# Patient Record
Sex: FEMALE | Race: BLACK OR AFRICAN AMERICAN | NOT HISPANIC OR LATINO | Employment: UNEMPLOYED | URBAN - METROPOLITAN AREA
[De-identification: names, ages, dates, MRNs, and addresses within clinical notes are randomized per-mention and may not be internally consistent; named-entity substitution may affect disease eponyms.]

---

## 2024-05-31 ENCOUNTER — HOSPITAL ENCOUNTER (EMERGENCY)
Facility: HOSPITAL | Age: 39
Discharge: HOME | End: 2024-05-31
Attending: INTERNAL MEDICINE
Payer: MEDICAID

## 2024-05-31 VITALS
TEMPERATURE: 98 F | HEIGHT: 63 IN | OXYGEN SATURATION: 100 % | SYSTOLIC BLOOD PRESSURE: 161 MMHG | BODY MASS INDEX: 41.64 KG/M2 | WEIGHT: 235 LBS | HEART RATE: 72 BPM | DIASTOLIC BLOOD PRESSURE: 99 MMHG | RESPIRATION RATE: 17 BRPM

## 2024-05-31 DIAGNOSIS — R07.89 CHEST WALL PAIN: Primary | ICD-10-CM

## 2024-05-31 DIAGNOSIS — R03.0 ELEVATED BLOOD PRESSURE READING: ICD-10-CM

## 2024-05-31 LAB
ALBUMIN SERPL-MCNC: 3.9 G/DL (ref 3.5–5)
ALBUMIN/GLOB SERPL: 1.2 RATIO (ref 1.1–2)
ALP SERPL-CCNC: 90 UNIT/L (ref 40–150)
ALT SERPL-CCNC: 14 UNIT/L (ref 0–55)
ANION GAP SERPL CALC-SCNC: 8 MEQ/L
AST SERPL-CCNC: 14 UNIT/L (ref 5–34)
BASOPHILS # BLD AUTO: 0.07 X10(3)/MCL
BASOPHILS NFR BLD AUTO: 0.7 %
BILIRUB SERPL-MCNC: 0.1 MG/DL
BUN SERPL-MCNC: 8 MG/DL (ref 7–18.7)
CALCIUM SERPL-MCNC: 8.9 MG/DL (ref 8.4–10.2)
CHLORIDE SERPL-SCNC: 109 MMOL/L (ref 98–107)
CO2 SERPL-SCNC: 23 MMOL/L (ref 22–29)
CREAT SERPL-MCNC: 0.7 MG/DL (ref 0.55–1.02)
CREAT/UREA NIT SERPL: 11
EOSINOPHIL # BLD AUTO: 0.29 X10(3)/MCL (ref 0–0.9)
EOSINOPHIL NFR BLD AUTO: 3 %
ERYTHROCYTE [DISTWIDTH] IN BLOOD BY AUTOMATED COUNT: 16.2 % (ref 11.5–17)
GFR SERPLBLD CREATININE-BSD FMLA CKD-EPI: >60 ML/MIN/1.73/M2
GLOBULIN SER-MCNC: 3.3 GM/DL (ref 2.4–3.5)
GLUCOSE SERPL-MCNC: 94 MG/DL (ref 74–100)
HCT VFR BLD AUTO: 38 % (ref 37–47)
HGB BLD-MCNC: 12.1 G/DL (ref 12–16)
IMM GRANULOCYTES # BLD AUTO: 0.02 X10(3)/MCL (ref 0–0.04)
IMM GRANULOCYTES NFR BLD AUTO: 0.2 %
LYMPHOCYTES # BLD AUTO: 3.91 X10(3)/MCL (ref 0.6–4.6)
LYMPHOCYTES NFR BLD AUTO: 40.9 %
MAGNESIUM SERPL-MCNC: 2 MG/DL (ref 1.6–2.6)
MCH RBC QN AUTO: 26.9 PG (ref 27–31)
MCHC RBC AUTO-ENTMCNC: 31.8 G/DL (ref 33–36)
MCV RBC AUTO: 84.4 FL (ref 80–94)
MONOCYTES # BLD AUTO: 0.65 X10(3)/MCL (ref 0.1–1.3)
MONOCYTES NFR BLD AUTO: 6.8 %
NEUTROPHILS # BLD AUTO: 4.63 X10(3)/MCL (ref 2.1–9.2)
NEUTROPHILS NFR BLD AUTO: 48.4 %
PLATELET # BLD AUTO: 358 X10(3)/MCL (ref 130–400)
PMV BLD AUTO: 9.4 FL (ref 7.4–10.4)
POTASSIUM SERPL-SCNC: 3.8 MMOL/L (ref 3.5–5.1)
PROT SERPL-MCNC: 7.2 GM/DL (ref 6.4–8.3)
RBC # BLD AUTO: 4.5 X10(6)/MCL (ref 4.2–5.4)
SODIUM SERPL-SCNC: 140 MMOL/L (ref 136–145)
TROPONIN I SERPL-MCNC: <0.01 NG/ML (ref 0–0.04)
WBC # SPEC AUTO: 9.57 X10(3)/MCL (ref 4.5–11.5)

## 2024-05-31 PROCEDURE — 83735 ASSAY OF MAGNESIUM: CPT | Performed by: INTERNAL MEDICINE

## 2024-05-31 PROCEDURE — 84484 ASSAY OF TROPONIN QUANT: CPT | Performed by: INTERNAL MEDICINE

## 2024-05-31 PROCEDURE — 80053 COMPREHEN METABOLIC PANEL: CPT | Performed by: INTERNAL MEDICINE

## 2024-05-31 PROCEDURE — 85025 COMPLETE CBC W/AUTO DIFF WBC: CPT | Performed by: INTERNAL MEDICINE

## 2024-05-31 PROCEDURE — 63600175 PHARM REV CODE 636 W HCPCS: Performed by: INTERNAL MEDICINE

## 2024-05-31 PROCEDURE — 99285 EMERGENCY DEPT VISIT HI MDM: CPT | Mod: 25

## 2024-05-31 PROCEDURE — 93010 ELECTROCARDIOGRAM REPORT: CPT | Mod: ,,, | Performed by: INTERNAL MEDICINE

## 2024-05-31 PROCEDURE — 96372 THER/PROPH/DIAG INJ SC/IM: CPT | Performed by: INTERNAL MEDICINE

## 2024-05-31 PROCEDURE — 93005 ELECTROCARDIOGRAM TRACING: CPT

## 2024-05-31 RX ORDER — TIZANIDINE 4 MG/1
4 TABLET ORAL EVERY 8 HOURS PRN
Qty: 15 TABLET | Refills: 0 | Status: SHIPPED | OUTPATIENT
Start: 2024-05-31

## 2024-05-31 RX ORDER — IBUPROFEN 600 MG/1
600 TABLET ORAL EVERY 6 HOURS PRN
Qty: 20 TABLET | Refills: 0 | Status: SHIPPED | OUTPATIENT
Start: 2024-05-31

## 2024-05-31 RX ORDER — TRAMADOL HYDROCHLORIDE 50 MG/1
50 TABLET ORAL EVERY 6 HOURS PRN
Qty: 15 TABLET | Refills: 0 | Status: SHIPPED | OUTPATIENT
Start: 2024-05-31

## 2024-05-31 RX ORDER — KETOROLAC TROMETHAMINE 30 MG/ML
60 INJECTION, SOLUTION INTRAMUSCULAR; INTRAVENOUS
Status: COMPLETED | OUTPATIENT
Start: 2024-05-31 | End: 2024-05-31

## 2024-05-31 RX ADMIN — KETOROLAC TROMETHAMINE 60 MG: 30 INJECTION, SOLUTION INTRAMUSCULAR; INTRAVENOUS at 04:05

## 2024-05-31 NOTE — ED PROVIDER NOTES
Source of History:  Patient, no limitations    Chief complaint:  Chest Pain ( Left CP that started 2 weeks ago   has radiated into post neck and traps over last few days)      HPI:  Bentley Cevallos is a 38 y.o. female presenting with Chest Pain ( Left CP that started 2 weeks ago   has radiated into post neck and traps over last few days)         The patient complains of chest pressure/discomfort. The discomfort is described as pleuritic, sharp with radiation to the left scapula. Onset of symptoms was 2 weeks ago, stable course since that time. The patient also complains of pain with movements and deep breathing. The patient denies fever. Patient's cardiac risk factors are obesity (BMI >= 30 kg/m2), sedentary lifestyle, and smoking/ tobacco exposure.       Review of Systems   Constitutional symptoms:  Negative except as documented in HPI.   Skin symptoms:  Negative except as documented in HPI.   HEENT symptoms:  Negative except as documented in HPI.   Respiratory symptoms:  Negative except as documented in HPI.   Cardiovascular symptoms:  Negative except as documented in HPI.   Gastrointestinal symptoms:  Negative except as documented in HPI.    Genitourinary symptoms:  Negative except as documented in HPI.   Musculoskeletal symptoms:  Negative except as documented in HPI.   Neurologic symptoms:  Negative except as documented in HPI.   Psychiatric symptoms:  Negative except as documented in HPI.   Allergy/immunologic symptoms:  Negative except as documented in HPI.             Additional review of systems information: All other systems reviewed and otherwise negative.      Review of patient's allergies indicates:  No Known Allergies    PMH:  As per HPI and below:    Past Medical History:   Diagnosis Date    Acid reflux     Anxiety     Depression        Family History   Problem Relation Name Age of Onset    Lupus Mother      Diabetes Mellitus Mother      Hypertension Father      Hypertension Brother      Gout  "Brother      No Known Problems Daughter         Past Surgical History:   Procedure Laterality Date     SECTION      GALLBLADDER SURGERY         Social History     Tobacco Use    Smoking status: Every Day   Substance Use Topics    Alcohol use: Yes     Alcohol/week: 0.0 standard drinks of alcohol     Comment: acc    Drug use: No       There is no problem list on file for this patient.       Physical Exam:    BP (!) 161/99   Pulse 72   Temp 98 °F (36.7 °C)   Resp 17   Ht 5' 3" (1.6 m)   Wt 106.6 kg (235 lb)   SpO2 100%   BMI 41.63 kg/m²     Nursing note and vital signs reviewed.    General:  Alert, no acute distress.   Skin: Normal for Ethnic Origin, No cyanosis  HEENT: Normocephalic and atraumatic, Vision unchanged  Cardiovascular:  Regular rate and rhythm  Chest Wall: No deformity, equal chest rise, reproducible left upper chest wall tenderness into left periscapular area  Respiratory:  Lungs are clear to auscultation, respirations are non-labored.    Musculoskeletal:  No deformity, Normal perfusion to all extremities  Gastrointestinal:  Soft, Non distended  Neurological:  Alert and oriented, normal motor observed, normal speech observed.    Psychiatric:  Cooperative, appropriate mood & affect.        Labs that have been ordered have been independently reviewed and interpreted by myself.     Old Chart Reviewed.      Initial Impression/ Differential Dx:  Bronchitis, URI, allergies, irritants, pulmonary edema, GERD, laryngitis, tracheitis, asthma, sinusitis, pneumonia, viral, COPD, medication side effect  Myocardial ischemia, pericarditis, pulmonary embolus, chest wall pain, pleural inflammation and pulmonary infectious causes.      MDM:      Reviewed Nurses Note.    Reviewed Pertinent old records.    Orders Placed This Encounter    Pulse Oximeter    X-Ray Chest PA And Lateral    CBC Auto Differential    Comprehensive Metabolic Panel    Troponin I    Magnesium    CBC with Differential    Cardiac " Monitoring - Adult    EKG 12-lead    ketorolac injection 60 mg    traMADoL (ULTRAM) 50 mg tablet    tiZANidine (ZANAFLEX) 4 MG tablet    ibuprofen (ADVIL,MOTRIN) 600 MG tablet                    Labs Reviewed   COMPREHENSIVE METABOLIC PANEL - Abnormal; Notable for the following components:       Result Value    Chloride 109 (*)     All other components within normal limits   CBC WITH DIFFERENTIAL - Abnormal; Notable for the following components:    MCH 26.9 (*)     MCHC 31.8 (*)     All other components within normal limits   TROPONIN I - Normal   MAGNESIUM - Normal   CBC W/ AUTO DIFFERENTIAL    Narrative:     The following orders were created for panel order CBC Auto Differential.  Procedure                               Abnormality         Status                     ---------                               -----------         ------                     CBC with Differential[[id number]]       Abnormal            Final result                 Please view results for these tests on the individual orders.          X-Ray Chest PA And Lateral   Final Result      1. No active cardiopulmonary disease identified         Electronically signed by: Nils Watkins   Date:    05/31/2024   Time:    16:35           Admission on 05/31/2024, Discharged on 05/31/2024   Component Date Value Ref Range Status    Sodium 05/31/2024 140  136 - 145 mmol/L Final    Potassium 05/31/2024 3.8  3.5 - 5.1 mmol/L Final    Chloride 05/31/2024 109 (H)  98 - 107 mmol/L Final    CO2 05/31/2024 23  22 - 29 mmol/L Final    Glucose 05/31/2024 94  74 - 100 mg/dL Final    Blood Urea Nitrogen 05/31/2024 8.0  7.0 - 18.7 mg/dL Final    Creatinine 05/31/2024 0.70  0.55 - 1.02 mg/dL Final    Calcium 05/31/2024 8.9  8.4 - 10.2 mg/dL Final    Protein Total 05/31/2024 7.2  6.4 - 8.3 gm/dL Final    Albumin 05/31/2024 3.9  3.5 - 5.0 g/dL Final    Globulin 05/31/2024 3.3  2.4 - 3.5 gm/dL Final    Albumin/Globulin Ratio 05/31/2024 1.2  1.1 - 2.0 ratio Final    Bilirubin  Total 05/31/2024 0.1  <=1.5 mg/dL Final    ALP 05/31/2024 90  40 - 150 unit/L Final    ALT 05/31/2024 14  0 - 55 unit/L Final    AST 05/31/2024 14  5 - 34 unit/L Final    eGFR 05/31/2024 >60  mL/min/1.73/m2 Final    Anion Gap 05/31/2024 8.0  mEq/L Final    BUN/Creatinine Ratio 05/31/2024 11   Final    Troponin-I 05/31/2024 <0.010  0.000 - 0.045 ng/mL Final    Magnesium Level 05/31/2024 2.00  1.60 - 2.60 mg/dL Final    WBC 05/31/2024 9.57  4.50 - 11.50 x10(3)/mcL Final    RBC 05/31/2024 4.50  4.20 - 5.40 x10(6)/mcL Final    Hgb 05/31/2024 12.1  12.0 - 16.0 g/dL Final    Hct 05/31/2024 38.0  37.0 - 47.0 % Final    MCV 05/31/2024 84.4  80.0 - 94.0 fL Final    MCH 05/31/2024 26.9 (L)  27.0 - 31.0 pg Final    MCHC 05/31/2024 31.8 (L)  33.0 - 36.0 g/dL Final    RDW 05/31/2024 16.2  11.5 - 17.0 % Final    Platelet 05/31/2024 358  130 - 400 x10(3)/mcL Final    MPV 05/31/2024 9.4  7.4 - 10.4 fL Final    Neut % 05/31/2024 48.4  % Final    Lymph % 05/31/2024 40.9  % Final    Mono % 05/31/2024 6.8  % Final    Eos % 05/31/2024 3.0  % Final    Basophil % 05/31/2024 0.7  % Final    Lymph # 05/31/2024 3.91  0.6 - 4.6 x10(3)/mcL Final    Neut # 05/31/2024 4.63  2.1 - 9.2 x10(3)/mcL Final    Mono # 05/31/2024 0.65  0.1 - 1.3 x10(3)/mcL Final    Eos # 05/31/2024 0.29  0 - 0.9 x10(3)/mcL Final    Baso # 05/31/2024 0.07  <=0.2 x10(3)/mcL Final    IG# 05/31/2024 0.02  0 - 0.04 x10(3)/mcL Final    IG% 05/31/2024 0.2  % Final       Imaging Results              X-Ray Chest PA And Lateral (Final result)  Result time 05/31/24 16:35:05      Final result by Nils Watkins MD (05/31/24 16:35:05)                   Impression:      1. No active cardiopulmonary disease identified      Electronically signed by: Nils Watkins  Date:    05/31/2024  Time:    16:35               Narrative:    EXAMINATION:  XR CHEST PA AND LATERAL    CLINICAL HISTORY:  chest wall pain;, .    COMPARISON:  09/09/2013    FINDINGS:  PA/AP and lateral views reveal the  heart to be normal in size.  The trachea is midline.  No definite infiltrate or effusion is seen.  Mild degenerative changes are noted to the thoracic spine.                                        ECG Results              EKG 12-lead (Preliminary result)  Result time 05/31/24 15:55:28      Wet Read by Darnell Velez DO (05/31/24 15:55:28, [facility] - Emergency Dept, Emergency Medicine)    Independent ECG Interpretation:    Normal sinus rhythm at rate of 75. Normal intervals. Normal QRS. No acute ST or T wave abnormalities. Overall impression: No evidence of acute ischemia or arrhythmia.                                                ED Course as of 05/31/24 1709   Fri May 31, 2024   1634 WBC: 9.57 [MP]   1637 Hemoglobin: 12.1 [MP]   1637 Hematocrit: 38.0 [MP]      ED Course User Index  [MP] Darnell Velez DO                        Diagnostic Impression:    1. Chest wall pain    2. Elevated blood pressure reading         ED Disposition Condition    Discharge Stable             Follow-up Information       [facility] - Emergency Dept.    Specialty: Emergency Medicine  Why: If symptoms worsen  Contact information:  [address] [place] [phone]             Schedule an appointment as soon as possible for a visit  with Katty Naidu PA-C.    Specialty: Internal Medicine  Contact information:  [address] [place] [id number] [phone]                              ED Prescriptions       Medication Sig Dispense Start Date End Date Auth. Provider    traMADoL (ULTRAM) 50 mg tablet Take 1 tablet (50 mg total) by mouth every 6 (six) hours as needed for Pain. 15 tablet 5/31/2024 -- Darnell Velez DO    tiZANidine (ZANAFLEX) 4 MG tablet Take 1 tablet (4 mg total) by mouth every 8 (eight) hours as needed (spasms). 15 tablet 5/31/2024 -- Darnell Velez DO    ibuprofen (ADVIL,MOTRIN) 600 MG tablet Take 1 tablet (600 mg total)  by mouth every 6 (six) hours as needed for Pain. 20 tablet 5/31/2024 -- Darnell Velez DO          Follow-up Information       Follow up With Specialties Details Why Contact Info    [facility] - Emergency Dept Emergency Medicine  If symptoms worsen [address]  [facility] [address]  [phone]    Katty Naidu, PA-C Internal Medicine Schedule an appointment as soon as possible for a visit   [address] [place] [id number] [phone]               Darnell Velez DO  05/31/24 2201

## 2024-06-02 LAB
OHS QRS DURATION: 78 MS
OHS QTC CALCULATION: 446 MS

## 2025-03-20 ENCOUNTER — HOSPITAL ENCOUNTER (OUTPATIENT)
Dept: RADIOLOGY | Facility: HOSPITAL | Age: 40
Discharge: HOME | End: 2025-03-20
Payer: MEDICAID

## 2025-03-20 DIAGNOSIS — M25.511 RIGHT SHOULDER PAIN: ICD-10-CM

## 2025-03-20 PROCEDURE — 73030 X-RAY EXAM OF SHOULDER: CPT | Mod: TC,RT

## 2025-05-14 ENCOUNTER — HOSPITAL ENCOUNTER (EMERGENCY)
Facility: HOSPITAL | Age: 40
Discharge: HOME | End: 2025-05-14
Attending: EMERGENCY MEDICINE
Payer: MEDICAID

## 2025-05-14 VITALS
BODY MASS INDEX: 40.4 KG/M2 | OXYGEN SATURATION: 98 % | WEIGHT: 228 LBS | HEART RATE: 91 BPM | HEIGHT: 63 IN | SYSTOLIC BLOOD PRESSURE: 128 MMHG | RESPIRATION RATE: 16 BRPM | DIASTOLIC BLOOD PRESSURE: 79 MMHG | TEMPERATURE: 100 F

## 2025-05-14 DIAGNOSIS — J06.9 VIRAL URI WITH COUGH: Primary | ICD-10-CM

## 2025-05-14 LAB
FLUAV AG UPPER RESP QL IA.RAPID: NOT DETECTED
FLUBV AG UPPER RESP QL IA.RAPID: NOT DETECTED
SARS-COV-2 RNA RESP QL NAA+PROBE: NOT DETECTED

## 2025-05-14 PROCEDURE — 99283 EMERGENCY DEPT VISIT LOW MDM: CPT

## 2025-05-14 PROCEDURE — 25000003 PHARM REV CODE 250

## 2025-05-14 PROCEDURE — 0240U COVID/FLU A&B PCR: CPT

## 2025-05-14 RX ORDER — IBUPROFEN 600 MG/1
600 TABLET, FILM COATED ORAL
Status: COMPLETED | OUTPATIENT
Start: 2025-05-14 | End: 2025-05-14

## 2025-05-14 RX ORDER — CETIRIZINE HYDROCHLORIDE 10 MG/1
10 TABLET ORAL DAILY
Qty: 30 TABLET | Refills: 0 | Status: SHIPPED | OUTPATIENT
Start: 2025-05-14 | End: 2026-05-14

## 2025-05-14 RX ORDER — ONDANSETRON 4 MG/1
4 TABLET, ORALLY DISINTEGRATING ORAL EVERY 6 HOURS PRN
Qty: 40 TABLET | Refills: 0 | Status: SHIPPED | OUTPATIENT
Start: 2025-05-14 | End: 2025-05-24

## 2025-05-14 RX ORDER — FLUTICASONE PROPIONATE 50 MCG
1 SPRAY, SUSPENSION (ML) NASAL 2 TIMES DAILY PRN
Qty: 15 G | Refills: 0 | Status: SHIPPED | OUTPATIENT
Start: 2025-05-14

## 2025-05-14 RX ADMIN — IBUPROFEN 600 MG: 600 TABLET, FILM COATED ORAL at 01:05

## 2025-05-14 NOTE — DISCHARGE INSTRUCTIONS
Continue tylenol/motrin for fever/body aches. Cetirizine, flonase, zofran sent to pharmacy. Mucinex DM, dayquil/nyquil for symptoms. Stay well hydrated. Follow-up with PCP. Return with new or worsening symptoms.

## 2025-05-14 NOTE — ED PROVIDER NOTES
Encounter Date: 2025       History     Chief Complaint   Patient presents with    Generalized Body Aches     Pt c/o body aches, headache and fever since  and congestion starting yesterday.  Tested negative for Covid and Flu on Monday or Tuesday.      See MDM    The history is provided by the patient. No  was used.     Review of patient's allergies indicates:  No Known Allergies  Past Medical History:   Diagnosis Date    Acid reflux     Anxiety     Depression      Past Surgical History:   Procedure Laterality Date     SECTION      GALLBLADDER SURGERY       Family History   Problem Relation Name Age of Onset    Lupus Mother      Diabetes Mellitus Mother      Hypertension Father      Hypertension Brother      Gout Brother      No Known Problems Daughter       Social History[1]  Review of Systems   Constitutional:  Positive for chills and fever.   HENT:  Positive for congestion. Negative for rhinorrhea and sore throat.    Respiratory:  Negative for cough.    Gastrointestinal:  Positive for diarrhea and nausea. Negative for abdominal pain and vomiting.   Genitourinary:  Negative for dysuria and hematuria.   Musculoskeletal:  Positive for myalgias (generalized body aches).   All other systems reviewed and are negative.      Physical Exam     Initial Vitals [25 1310]   BP Pulse Resp Temp SpO2   (!) 131/90 (!) 113 18 (!) 101.9 °F (38.8 °C) 98 %      MAP       --         Physical Exam    Nursing note and vitals reviewed.  Constitutional: She appears well-developed and well-nourished. She is not diaphoretic. No distress.   HENT:   Head: Normocephalic and atraumatic.   Right Ear: External ear normal.   Left Ear: External ear normal. Mouth/Throat: Oropharynx is clear and moist.   Cardiovascular:  Normal rate, regular rhythm and normal heart sounds.     Exam reveals no gallop and no friction rub.       No murmur heard.  Pulmonary/Chest: Breath sounds normal. No respiratory distress.  She has no wheezes. She has no rhonchi. She has no rales.   Musculoskeletal:         General: Normal range of motion.     Neurological: She is alert and oriented to person, place, and time.   Skin: Skin is warm and dry.   Psychiatric: She has a normal mood and affect. Her behavior is normal.         ED Course   Procedures  Labs Reviewed   COVID/FLU A&B PCR - Normal       Result Value    Influenza A PCR Not Detected      Influenza B PCR Not Detected      SARS-CoV-2 PCR Not Detected      Narrative:     The Xpert Xpress SARS-CoV-2/FLU/RSV plus is a rapid, multiplexed real-time PCR test intended for the simultaneous qualitative detection and differentiation of SARS-CoV-2, Influenza A, Influenza B, and respiratory syncytial virus (RSV) viral RNA in either nasopharyngeal swab or nasal swab specimens.                Imaging Results    None          Medications   ibuprofen tablet 600 mg (600 mg Oral Given 5/14/25 1325)     Medical Decision Making  Pt is a 40 y/o female with hx of GERD, anxiety, depression who presents with fever and body aches x4 days. States that she went to urgent care on Monday and had fever of 101.5 F, was tested for covid and flu which were negative at that time. Has been treating fever at home with tylenol and motrin, last dose tylenol about 6 hours ago. Notes that she developed congestion yesterday but denies sore throat, rhinorrhea, or ear pain. Has had associated diarrhea. Denies nausea, vomiting, abdominal pain, urinary symptoms. No known sick contacts.     Pt not toxic appearing, no acute distress. Fever treated and coming down in ED. Negative covid/flu. Instructed pt on continued conservative management and to follow-up with PCP by end of week. Strict ED precautions given. Pt expressed understanding and was in agreement with the plan.     Amount and/or Complexity of Data Reviewed  Labs:  Decision-making details documented in ED Course.    Risk  OTC drugs.  Prescription drug  management.      Additional MDM:   Differential Diagnosis:   Other: The following diagnoses were also considered and will be evaluated: covid, flu and viral uri.                                   Clinical Impression:  Final diagnoses:  [J06.9] Viral URI with cough (Primary)          ED Disposition Condition    Discharge Stable          ED Prescriptions       Medication Sig Dispense Start Date End Date Auth. Provider    cetirizine (ZYRTEC) 10 MG tablet Take 1 tablet (10 mg total) by mouth once daily. 30 tablet 5/14/2025 5/14/2026 Kathy Campbell PA    fluticasone propionate (FLONASE) 50 mcg/actuation nasal spray 1 spray (50 mcg total) by Each Nostril route 2 (two) times daily as needed for Rhinitis. 15 g 5/14/2025 -- Kathy Campbell PA    ondansetron (ZOFRAN-ODT) 4 MG TbDL Take 1 tablet (4 mg total) by mouth every 6 (six) hours as needed. 40 tablet 5/14/2025 5/24/2025 Kathy Campbell PA          Follow-up Information       Follow up With Specialties Details Why Contact Info    Katty Naidu PA-C Internal Medicine Call in 1 week  [address] [facility] [address]  [phone]                   [1]   Social History  Tobacco Use    Smoking status: Every Day   Substance Use Topics    Alcohol use: Yes     Alcohol/week: 0.0 standard drinks of alcohol     Comment: acc    Drug use: No        Kathy Campbell PA  05/14/25 [id number]